# Patient Record
Sex: MALE | URBAN - METROPOLITAN AREA
[De-identification: names, ages, dates, MRNs, and addresses within clinical notes are randomized per-mention and may not be internally consistent; named-entity substitution may affect disease eponyms.]

---

## 2017-03-20 ENCOUNTER — HOSPITAL ENCOUNTER (OUTPATIENT)
Dept: SURGERY | Age: 57
Discharge: HOME | End: 2017-03-20

## 2017-03-23 NOTE — PERIOP NOTES
Pt states he has the flu and cannot have surgery and that he had notified office. Notified Paolo Marisela in scheduling that pt is cancelling his case. She will call office.